# Patient Record
Sex: MALE | Race: OTHER | HISPANIC OR LATINO
[De-identification: names, ages, dates, MRNs, and addresses within clinical notes are randomized per-mention and may not be internally consistent; named-entity substitution may affect disease eponyms.]

---

## 2022-03-28 PROBLEM — Z00.00 ENCOUNTER FOR PREVENTIVE HEALTH EXAMINATION: Status: ACTIVE | Noted: 2022-03-28

## 2022-03-29 ENCOUNTER — APPOINTMENT (OUTPATIENT)
Dept: PAIN MANAGEMENT | Facility: CLINIC | Age: 47
End: 2022-03-29
Payer: COMMERCIAL

## 2022-03-29 ENCOUNTER — NON-APPOINTMENT (OUTPATIENT)
Age: 47
End: 2022-03-29

## 2022-03-29 VITALS
HEIGHT: 73 IN | DIASTOLIC BLOOD PRESSURE: 98 MMHG | SYSTOLIC BLOOD PRESSURE: 149 MMHG | BODY MASS INDEX: 35.52 KG/M2 | TEMPERATURE: 98 F | WEIGHT: 268 LBS

## 2022-03-29 DIAGNOSIS — Z78.9 OTHER SPECIFIED HEALTH STATUS: ICD-10-CM

## 2022-03-29 DIAGNOSIS — F17.200 NICOTINE DEPENDENCE, UNSPECIFIED, UNCOMPLICATED: ICD-10-CM

## 2022-03-29 PROCEDURE — 99204 OFFICE O/P NEW MOD 45 MIN: CPT

## 2022-03-29 NOTE — DATA REVIEWED
[FreeTextEntry1] : CERVICAL SPINE CT WITHOUT CONTRAST 3/23/2022 \par \par  The cervical alignment is maintained without spondylolisthesis. No acute fracture is identified. \par The vertebral body and disk space heights are preserved. Small multilevel anterior osteophytes are \par seen. No prevertebral soft tissue swelling is demonstrated. \par \par  Evaluation of the spinal canal is limited due to artifact related to patient's body habitus. There \par is mild canal stenosis at C6-7 due to a disc osteophyte complex. There is mild bony neural foraminal\par narrowing due to uncovertebral spurring on the left at C5-6 and bilaterally at C6-7. \par \par  Bovine aortic arch configuration is incidentally noted. Visualized aortic arch is mildly ectatic \par measuring 3.3 cm in diameter. \par \par \par  IMPRESSION: \par \par  No acute osseous abnormality of the cervical spine. \par \par  Limited examination of the spinal canal due to patient's body habitus. Within this limitation, \par there appears to be mild canal stenosis at C6-7 and mild bony neural foraminal narrowing on the left\par at C5-6 and bilaterally at C6-7. Consider further nonemergent evaluation with MRI. \par \par LUMBAR SPINE CT WITHOUT CONTRAST 3/23/2022 \par \par  Mild lumbar levocurvature is again noted. No acute fracture or spondylolisthesis is identified. The\par vertebral body heights are preserved. There is mild disc height loss at L2-3 and moderate height \par loss at L5-S1, similar to previous exam. Mild multilevel anterior osteophytosis has increased. The \par paraspinal soft tissues are within normal limits. \par \par  At L2-3, there is again a central posterior disc osteophyte complex without central canal or \par significant neural foraminal stenosis. \par \par  At L3-4, there is a new small central posterior disc osteophyte complex superimposed on disc bulge \par without central canal stenosis. Mild to moderate left neural foraminal stenosis due to uncovertebral\par spurring is noted, not substantially changed in degree of stenosis. \par \par  At L4-5, there is a prominent right paracentral posterior disc osteophyte complex which is \par increased in size. This causes mild central canal stenosis, severe right lateral recess stenosis, \par and moderate right neural foraminal stenosis, all of which appear grossly unchanged to slightly \par increased. \par \par  At L5-S1, there is a prominent posterior disc osteophyte complex which does not appear \par substantially changed in size. There is again mild central canal stenosis and severe left lateral \par recess stenosis. Mild to moderate left neural foraminal stenosis appears grossly unchanged. \par \par \par  IMPRESSION: \par \par  No acute osseous abnormality of the lumbar spine. \par \par  Multilevel lumbar spondylosis with mild central canal stenosis at L4-5 and L5-S1, severe right \par lateral recess stenosis at L4-5, and severe left lateral recess stenosis at L5-S1. Additional \par details above.

## 2022-03-29 NOTE — HISTORY OF PRESENT ILLNESS
[10 (pain as bad as you can imagine)] : 1. What number best describes your pain on average in the past week? 10/10 pain [10 (completely interferes)] : 3. What number best describes how, during the past week, pain has interfered with your general activity? 10/10 pain [___ wks] : [unfilled] week(s) ago [Constant] : constant [10] : a maximum pain level of 10/10 [Aching] : aching [Throbbing] : throbbing [Burning] : burning [Shooting] : shooting [Laying] : laying [Bending] : bending [Medications] : medications [Heat] : heat [Ice] : ice [FreeTextEntry1] : 46 yom presents w/ severe neck and right arm pain. Left arm is wnl. He was sent from the emergency room for evaluation. Pain has been worsening for two weeks. He has never had pain like this prior. He has tightness in his chest.  [FreeTextEntry2] : 30 [FreeTextEntry7] : Patient was in the er at [place].   Pain on right side .

## 2022-03-29 NOTE — PHYSICAL EXAM
[de-identified] : Constitutional: Well-developed, in no acute distress\par \par Musculoskeletal:\par Cervical Spine:   \par Gait: Antalgic\par Inspection: Normal curvature, no abnormal kyphosis or scoliosis\par \par Facet loading: pain bilaterally\par \par Palpation:\par Cervical paraspinal muscles: pain bilaterally\par 		\par Muscle Strength:\par Deltoid: 5/5 bilaterally\par Biceps: 5/5 bilaterally\par Triceps: 5/5 bilaterally\par Adductor pollicis: 5/5 bilaterally\par \par Sensation: normal and equal in bilateral upper extremities\par \par Extremity: no edema noted\par Neurological: Memory normal, AAO x 3, Cranial nerves II - XII grossly normal\par Psychiatric: Appropriate mood and affect, oriented to time, place, person, and situation

## 2022-03-29 NOTE — ASSESSMENT
[FreeTextEntry1] : 46 yom presents w/ severe neck and right arm pain. He was in the emergency room for the severity of pain.\par \par I have personally reviewed the patient's CT in detail and discussed it with them which is significant for mild canal stenosis at C6-C7.\par \par The patient has failed to have relief with over six weeks of physical therapy within the last three months and all medications. GIven their failure to improve with all other conservative measures recommend MRI cervical spine. Patient will return to review imaging and plan for potential intervention.\par \par The patient has failed to have relief with medication management and physical therapy. Given the patients failure to improve with all other conservative measures, recommend C7-T1 interlaminar epidural steroid injection under fluoroscopic guidance. The patient will follow-up with me in my office two weeks following intervention.\par \par I have discussed in detail with the patient that an interventional spine procedure is associated with potential risks. The procedure may include an injection of steroid and potentially other medications (local anesthetic and normal saline) into the epidural space or surrounding tissue of the spine. There are significant risks of this procedure which include and are not limited to infection, bleeding, worsening pain, dural puncture leading to post-dural puncture headache, nerve damage, spinal cord injury, paralysis, stroke, and death. There is a chance that the procedure does not improve their pain. There are risks associated with the steroid being absorbed into the body systemically. These include dysphoria, difficulty sleeping, mood swings, and personality changes. Pre-menopausal women may notice a regularity his in her menstrual cycle for 2-3 months following the injection. Steroids can specifically affect patients with hypertension, diabetes, and peptic ulcers. The procedure may cause a temporary increase in blood pressure and blood glucose, and may adversely affect a peptic ulcer. Other, more rare complications, including avascular necrosis of the joints, glaucoma, and osteoporosis. I have discussed the risks of the procedure at length with the patient, and the potential benefits of pain relief. I have offered alternatives to the procedure. All questions were answered. The patient expressed understanding and wishes to proceed with the procedure.\par \par Physical therapy prescribed - goal will be to increase ROM, strengthening, postural training, other modalities ad jordan which may include massage and stim. Goals of therapy discussed with the patient in detail and will be discussed with physical therapist. Patient will follow-up following course of physical therapy to monitor progress and adjust therapy as needed.\par \par Acetaminophen 1,000 mg q8h prn for moderate pain. Risks, benefits, and alternatives of acetaminophen discussed with patient.\par \par gabapentin 300 mg prn add. Risks, benefits, and alternatives of ibuprofen discussed with patient.\par \par Diet and nutritional strategies discussed which may improve patients pain and will improve overall health.\par \par

## 2022-04-01 ENCOUNTER — RESULT REVIEW (OUTPATIENT)
Age: 47
End: 2022-04-01

## 2022-04-04 ENCOUNTER — TRANSCRIPTION ENCOUNTER (OUTPATIENT)
Age: 47
End: 2022-04-04

## 2022-04-04 ENCOUNTER — APPOINTMENT (OUTPATIENT)
Dept: PAIN MANAGEMENT | Facility: HOSPITAL | Age: 47
End: 2022-04-04

## 2022-04-04 ENCOUNTER — RESULT REVIEW (OUTPATIENT)
Age: 47
End: 2022-04-04

## 2022-04-07 ENCOUNTER — APPOINTMENT (OUTPATIENT)
Dept: PAIN MANAGEMENT | Facility: CLINIC | Age: 47
End: 2022-04-07

## 2022-04-20 ENCOUNTER — APPOINTMENT (OUTPATIENT)
Dept: PAIN MANAGEMENT | Facility: CLINIC | Age: 47
End: 2022-04-20

## 2022-06-06 ENCOUNTER — RX RENEWAL (OUTPATIENT)
Age: 47
End: 2022-06-06

## 2022-08-17 ENCOUNTER — RX RENEWAL (OUTPATIENT)
Age: 47
End: 2022-08-17

## 2024-05-16 ENCOUNTER — APPOINTMENT (OUTPATIENT)
Dept: PAIN MANAGEMENT | Facility: CLINIC | Age: 49
End: 2024-05-16
Payer: COMMERCIAL

## 2024-05-16 VITALS
WEIGHT: 268 LBS | DIASTOLIC BLOOD PRESSURE: 91 MMHG | OXYGEN SATURATION: 95 % | BODY MASS INDEX: 35.52 KG/M2 | HEART RATE: 115 BPM | HEIGHT: 73 IN | SYSTOLIC BLOOD PRESSURE: 135 MMHG

## 2024-05-16 DIAGNOSIS — M79.10 MYALGIA, UNSPECIFIED SITE: ICD-10-CM

## 2024-05-16 DIAGNOSIS — G56.21 LESION OF ULNAR NERVE, RIGHT UPPER LIMB: ICD-10-CM

## 2024-05-16 DIAGNOSIS — M47.812 SPONDYLOSIS W/OUT MYELOPATHY OR RADICULOPATHY, CERVICAL REGION: ICD-10-CM

## 2024-05-16 DIAGNOSIS — M54.12 RADICULOPATHY, CERVICAL REGION: ICD-10-CM

## 2024-05-16 PROCEDURE — 99214 OFFICE O/P EST MOD 30 MIN: CPT

## 2024-05-16 RX ORDER — OXYCODONE HYDROCHLORIDE AND ACETAMINOPHEN 10; 325 MG/1; MG/1
TABLET ORAL
Refills: 0 | Status: DISCONTINUED | COMMUNITY
End: 2022-05-16

## 2024-05-16 RX ORDER — METHYLPREDNISOLONE 4 MG/1
4 TABLET ORAL
Qty: 1 | Refills: 0 | Status: DISCONTINUED | COMMUNITY
Start: 2022-04-13 | End: 2022-05-16

## 2024-05-16 RX ORDER — NAPROXEN 375 MG/1
375 TABLET ORAL
Refills: 0 | Status: ACTIVE | COMMUNITY

## 2024-05-16 RX ORDER — GABAPENTIN 300 MG/1
300 CAPSULE ORAL
Qty: 90 | Refills: 0 | Status: DISCONTINUED | COMMUNITY
Start: 2022-03-29 | End: 2022-05-01

## 2024-05-16 RX ORDER — MELOXICAM 7.5 MG/1
7.5 TABLET ORAL
Qty: 60 | Refills: 0 | Status: ACTIVE | COMMUNITY
Start: 2024-05-16 | End: 1900-01-01

## 2024-05-16 RX ORDER — TIZANIDINE HYDROCHLORIDE 2 MG/1
2 CAPSULE ORAL
Qty: 30 | Refills: 0 | Status: DISCONTINUED | COMMUNITY
Start: 2022-03-30 | End: 2022-05-16

## 2024-05-16 RX ORDER — NAPROXEN 500 MG/1
TABLET ORAL
Refills: 0 | Status: DISCONTINUED | COMMUNITY
End: 2022-05-16

## 2024-05-16 RX ORDER — IBUPROFEN 600 MG/1
600 TABLET, FILM COATED ORAL
Refills: 0 | Status: ACTIVE | COMMUNITY

## 2024-05-16 NOTE — PHYSICAL EXAM
[de-identified] : Constitutional: Well-developed, in no acute distress  Musculoskeletal: Cervical Spine:    Gait: Antalgic Inspection: Normal curvature, no abnormal kyphosis or scoliosis  Facet loading: pain bilaterally  Palpation: Cervical paraspinal muscles: pain bilaterally 		 Muscle Strength: Deltoid: 5/5 bilaterally Biceps: 5/5 bilaterally Triceps: 5/5 bilaterally Adductor pollicis: 5/5 bilaterally  Sensation: normal and equal in bilateral upper extremities  Extremity: no edema noted Neurological: Memory normal, AAO x 3, Cranial nerves II - XII grossly normal Psychiatric: Appropriate mood and affect, oriented to time, place, person, and situation

## 2024-05-16 NOTE — DATA REVIEWED
[FreeTextEntry1] : 04/01/22 MRI Cervical Spine:   Cervical lordosis maintained. Cervical vertebral body heights are maintained. No significant spondylolisthesis.. Cervical vertebral body bone marrow signal within normal limits. Intervertebral disc spaces are maintained. No suspicious expansile or destructive osseous lesion identified. Paraspinal soft tissues are within normal limits. Visualized lung apices are unremarkable. Visualized vertebral artery flow voids are preserved. No abnormal cord signal identified. No significant periarticular or paraspinal soft tissue edema identified.   There are multilevel findings as follows:   Craniocervical junction unremarkable.   C2-C3: No significant canal or foraminal stenosis.   C3-C4: No significant canal or foraminal stenosis   C4-C5: No significant canal or foraminal stenosis   C5-C6: Disc osteophyte complex and bilateral uncovertebral/facet hypertrophy contributing to mild canal stenosis and mild bilateral foraminal stenosis.   C6-C7: Disc osteophyte complex and bilateral uncovertebral/facet hypertrophy contributing to mild canal stenosis and mild bilateral foraminal stenosis.   C7-T1: No significant canal or foraminal stenosis.     IMPRESSION:   Multilevel cervical spondylitic changes as detailed above most notable for mild canal stenosis and mild bilateral foraminal stenosis at C5-C6, C6-C7.   CERVICAL SPINE CT WITHOUT CONTRAST 3/23/2022    The cervical alignment is maintained without spondylolisthesis. No acute fracture is identified.  The vertebral body and disk space heights are preserved. Small multilevel anterior osteophytes are  seen. No prevertebral soft tissue swelling is demonstrated.    Evaluation of the spinal canal is limited due to artifact related to patient's body habitus. There  is mild canal stenosis at C6-7 due to a disc osteophyte complex. There is mild bony neural foraminal narrowing due to uncovertebral spurring on the left at C5-6 and bilaterally at C6-7.    Bovine aortic arch configuration is incidentally noted. Visualized aortic arch is mildly ectatic  measuring 3.3 cm in diameter.     IMPRESSION:    No acute osseous abnormality of the cervical spine.    Limited examination of the spinal canal due to patient's body habitus. Within this limitation,  there appears to be mild canal stenosis at C6-7 and mild bony neural foraminal narrowing on the left at C5-6 and bilaterally at C6-7. Consider further nonemergent evaluation with MRI.   LUMBAR SPINE CT WITHOUT CONTRAST 3/23/2022    Mild lumbar levocurvature is again noted. No acute fracture or spondylolisthesis is identified. The vertebral body heights are preserved. There is mild disc height loss at L2-3 and moderate height  loss at L5-S1, similar to previous exam. Mild multilevel anterior osteophytosis has increased. The  paraspinal soft tissues are within normal limits.    At L2-3, there is again a central posterior disc osteophyte complex without central canal or  significant neural foraminal stenosis.    At L3-4, there is a new small central posterior disc osteophyte complex superimposed on disc bulge  without central canal stenosis. Mild to moderate left neural foraminal stenosis due to uncovertebral spurring is noted, not substantially changed in degree of stenosis.    At L4-5, there is a prominent right paracentral posterior disc osteophyte complex which is  increased in size. This causes mild central canal stenosis, severe right lateral recess stenosis,  and moderate right neural foraminal stenosis, all of which appear grossly unchanged to slightly  increased.    At L5-S1, there is a prominent posterior disc osteophyte complex which does not appear  substantially changed in size. There is again mild central canal stenosis and severe left lateral  recess stenosis. Mild to moderate left neural foraminal stenosis appears grossly unchanged.     IMPRESSION:    No acute osseous abnormality of the lumbar spine.    Multilevel lumbar spondylosis with mild central canal stenosis at L4-5 and L5-S1, severe right  lateral recess stenosis at L4-5, and severe left lateral recess stenosis at L5-S1. Additional  details above.

## 2024-05-16 NOTE — HISTORY OF PRESENT ILLNESS
[___ wks] : [unfilled] week(s) ago [Constant] : constant [10] : a maximum pain level of 10/10 [Aching] : aching [Throbbing] : throbbing [Burning] : burning [Shooting] : shooting [Laying] : laying [Bending] : bending [Medications] : medications [Heat] : heat [Ice] : ice [10 (pain as bad as you can imagine)] : 1. What number best describes your pain on average in the past week? 10/10 pain [10 (completely interferes)] : 3. What number best describes how, during the past week, pain has interfered with your general activity? 10/10 pain [FreeTextEntry1] : Interval History: Pt returns for follow-up today. Pain is worsening. Radiates down the right arm. To the left shoulder. Numbness is severe. Quality of life is impaired. There has been a severe exacerbation of the patient's chronic pain.  HPI: 46 yom presents w/ severe neck and right arm pain. Left arm is wnl. He was sent from the emergency room for evaluation. Pain has been worsening for two weeks. He has never had pain like this prior. He has tightness in his chest.  [FreeTextEntry7] : Patient was in the er at [place].   Pain on right side .  [FreeTextEntry2] : 32

## 2024-11-14 ENCOUNTER — APPOINTMENT (OUTPATIENT)
Dept: FAMILY MEDICINE | Facility: CLINIC | Age: 49
End: 2024-11-14
Payer: COMMERCIAL

## 2024-11-14 VITALS
SYSTOLIC BLOOD PRESSURE: 160 MMHG | OXYGEN SATURATION: 96 % | BODY MASS INDEX: 33.4 KG/M2 | HEIGHT: 73 IN | TEMPERATURE: 100 F | DIASTOLIC BLOOD PRESSURE: 110 MMHG | WEIGHT: 252 LBS | HEART RATE: 85 BPM

## 2024-11-14 DIAGNOSIS — F41.9 ANXIETY DISORDER, UNSPECIFIED: ICD-10-CM

## 2024-11-14 DIAGNOSIS — M54.50 LOW BACK PAIN, UNSPECIFIED: ICD-10-CM

## 2024-11-14 DIAGNOSIS — G47.09 OTHER INSOMNIA: ICD-10-CM

## 2024-11-14 DIAGNOSIS — G89.29 LOW BACK PAIN, UNSPECIFIED: ICD-10-CM

## 2024-11-14 DIAGNOSIS — I10 ESSENTIAL (PRIMARY) HYPERTENSION: ICD-10-CM

## 2024-11-14 DIAGNOSIS — E66.811 OBESITY, CLASS 1: ICD-10-CM

## 2024-11-14 PROCEDURE — G2211 COMPLEX E/M VISIT ADD ON: CPT | Mod: NC

## 2024-11-14 PROCEDURE — 99204 OFFICE O/P NEW MOD 45 MIN: CPT

## 2024-11-14 RX ORDER — ZOLPIDEM TARTRATE 10 MG/1
10 TABLET ORAL
Qty: 30 | Refills: 0 | Status: ACTIVE | COMMUNITY
Start: 1900-01-01 | End: 1900-01-01

## 2024-11-14 RX ORDER — AMLODIPINE BESYLATE 10 MG/1
10 TABLET ORAL DAILY
Qty: 90 | Refills: 3 | Status: ACTIVE | COMMUNITY
Start: 1900-01-01 | End: 1900-01-01

## 2024-11-14 RX ORDER — ESCITALOPRAM OXALATE 10 MG/1
10 TABLET ORAL DAILY
Qty: 90 | Refills: 3 | Status: ACTIVE | COMMUNITY

## 2024-11-14 RX ORDER — SEMAGLUTIDE 1.34 MG/ML
4 INJECTION, SOLUTION SUBCUTANEOUS WEEKLY
Qty: 1 | Refills: 6 | Status: ACTIVE | COMMUNITY
Start: 2024-11-14 | End: 1900-01-01

## 2025-04-01 DIAGNOSIS — M25.521 PAIN IN RIGHT ELBOW: ICD-10-CM

## 2025-04-02 ENCOUNTER — APPOINTMENT (OUTPATIENT)
Dept: ORTHOPEDIC SURGERY | Facility: CLINIC | Age: 50
End: 2025-04-02

## 2025-04-02 ENCOUNTER — NON-APPOINTMENT (OUTPATIENT)
Age: 50
End: 2025-04-02

## 2025-04-02 VITALS
OXYGEN SATURATION: 98 % | SYSTOLIC BLOOD PRESSURE: 144 MMHG | HEART RATE: 85 BPM | HEIGHT: 73 IN | DIASTOLIC BLOOD PRESSURE: 86 MMHG | WEIGHT: 252 LBS | RESPIRATION RATE: 16 BRPM | TEMPERATURE: 98.3 F | BODY MASS INDEX: 33.4 KG/M2

## 2025-04-02 DIAGNOSIS — G56.21 LESION OF ULNAR NERVE, RIGHT UPPER LIMB: ICD-10-CM

## 2025-04-02 DIAGNOSIS — M19.021 PRIMARY OSTEOARTHRITIS, RIGHT ELBOW: ICD-10-CM

## 2025-04-02 PROCEDURE — 20605 DRAIN/INJ JOINT/BURSA W/O US: CPT | Mod: RT

## 2025-04-02 PROCEDURE — 99203 OFFICE O/P NEW LOW 30 MIN: CPT | Mod: 25

## 2025-04-02 PROCEDURE — 73080 X-RAY EXAM OF ELBOW: CPT | Mod: RT

## 2025-06-16 ENCOUNTER — APPOINTMENT (OUTPATIENT)
Dept: FAMILY MEDICINE | Facility: CLINIC | Age: 50
End: 2025-06-16
Payer: COMMERCIAL

## 2025-06-16 VITALS
HEART RATE: 84 BPM | BODY MASS INDEX: 33.8 KG/M2 | WEIGHT: 255 LBS | SYSTOLIC BLOOD PRESSURE: 130 MMHG | HEIGHT: 73 IN | DIASTOLIC BLOOD PRESSURE: 94 MMHG | OXYGEN SATURATION: 98 % | TEMPERATURE: 98.3 F

## 2025-06-16 PROBLEM — F19.981 DRUG-INDUCED SEXUAL DYSFUNCTION: Status: ACTIVE | Noted: 2025-06-16

## 2025-06-16 PROBLEM — E78.5 DYSLIPIDEMIA: Status: ACTIVE | Noted: 2025-06-16

## 2025-06-16 PROBLEM — R61 HYPERHIDROSIS: Status: ACTIVE | Noted: 2025-06-16

## 2025-06-16 PROCEDURE — G2211 COMPLEX E/M VISIT ADD ON: CPT | Mod: NC

## 2025-06-16 PROCEDURE — G0537: CPT

## 2025-06-16 PROCEDURE — 99204 OFFICE O/P NEW MOD 45 MIN: CPT

## 2025-06-16 RX ORDER — BUPROPION HYDROCHLORIDE 150 MG/1
150 TABLET, EXTENDED RELEASE ORAL DAILY
Qty: 90 | Refills: 0 | Status: ACTIVE | COMMUNITY
Start: 2025-06-16 | End: 1900-01-01